# Patient Record
Sex: FEMALE | Race: OTHER | Employment: FULL TIME | URBAN - METROPOLITAN AREA
[De-identification: names, ages, dates, MRNs, and addresses within clinical notes are randomized per-mention and may not be internally consistent; named-entity substitution may affect disease eponyms.]

---

## 2017-04-11 ENCOUNTER — OFFICE VISIT (OUTPATIENT)
Dept: INTERNAL MEDICINE CLINIC | Age: 32
End: 2017-04-11

## 2017-04-11 VITALS
SYSTOLIC BLOOD PRESSURE: 118 MMHG | HEIGHT: 64 IN | DIASTOLIC BLOOD PRESSURE: 72 MMHG | OXYGEN SATURATION: 99 % | BODY MASS INDEX: 22.23 KG/M2 | RESPIRATION RATE: 15 BRPM | WEIGHT: 130.2 LBS | HEART RATE: 88 BPM | TEMPERATURE: 98.5 F

## 2017-04-11 DIAGNOSIS — L63.9 ALOPECIA AREATA: ICD-10-CM

## 2017-04-11 DIAGNOSIS — F41.9 ANXIETY DISORDER, UNSPECIFIED TYPE: Primary | ICD-10-CM

## 2017-04-11 RX ORDER — HYDROCORTISONE 25 MG/G
OINTMENT TOPICAL 2 TIMES DAILY
Qty: 30 G | Refills: 0 | Status: SHIPPED | OUTPATIENT
Start: 2017-04-11

## 2017-04-11 RX ORDER — FLUOXETINE 10 MG/1
10 TABLET ORAL DAILY
Qty: 30 TAB | Refills: 0 | Status: SHIPPED | OUTPATIENT
Start: 2017-04-11 | End: 2017-05-08 | Stop reason: SDUPTHER

## 2017-04-11 RX ORDER — CLOBETASOL PROPIONATE 0.5 MG/G
CREAM TOPICAL
Refills: 99 | COMMUNITY
Start: 2017-01-24 | End: 2017-04-11 | Stop reason: ALTCHOICE

## 2017-04-11 RX ORDER — PAROXETINE 10 MG/1
TABLET, FILM COATED ORAL
Refills: 1 | COMMUNITY
Start: 2017-03-09 | End: 2017-04-11 | Stop reason: ALTCHOICE

## 2017-04-11 NOTE — PROGRESS NOTES
Written by Alexander Davis, as dictated by Dr. Ayesha Oconnor MD.    Kenya Collins is a 32 y.o. female. HPI  The patient comes in today C/O hair loss and she is concerned about post partum depression. Her baby is 14 months now. She is having hair loss in big clumps and she is concerned because she has two balding spots from the hair loss. She denies any FMHx of alopecia. She went back on birth control pills 3 months ago. She has weaned the Paxil down to 5 mg and she feels like she is struggling on this medication. She is thinking about getting pregnant again and she wants to be on a medication that is safe for anxiety during her pregnancy. Patient Active Problem List   Diagnosis Code    IBS (irritable bowel syndrome) K58.9    Other and unspecified hyperlipidemia E78.5        Current Outpatient Prescriptions on File Prior to Visit   Medication Sig Dispense Refill    ethynodiol-ethinyl estradiol (Rere Shannon 1/35, 28,) 1-35 mg-mcg per tablet Take  by mouth.  PNV CMB#21/IRON/FOLIC ACID (PRENATAL COMPLETE PO) Take  by mouth.  ALPRAZolam (XANAX) 0.25 mg tablet Take 1 Tab by mouth nightly as needed for Anxiety for up to 12 doses. Max Daily Amount: 0.25 mg. 12 Tab 0    Cetirizine 10 mg cap Take  by mouth.  fiber Cap Take  by mouth.  ferrous sulfate (IRON) 325 mg (65 mg elemental iron) tablet Take  by mouth Daily (before breakfast). No current facility-administered medications on file prior to visit. Allergies   Allergen Reactions    Benzocaine Swelling     Lips swell       Past Medical History:   Diagnosis Date    Hypercholesterolemia        History reviewed. No pertinent surgical history.     Family History   Problem Relation Age of Onset    Stroke Maternal Grandfather     Dementia Paternal Grandmother      Alzheimer's    Cancer Paternal Grandfather      rare blood cancer    Elevated Lipids Father     Heart Disease Father      CAD stint    Elevated Lipids Brother        Social History     Social History    Marital status:      Spouse name: N/A    Number of children: N/A    Years of education: N/A     Occupational History    Not on file. Social History Main Topics    Smoking status: Never Smoker    Smokeless tobacco: Never Used    Alcohol use No    Drug use: No    Sexual activity: Yes     Partners: Male     Birth control/ protection: Pill, Condom     Other Topics Concern    Not on file     Social History Narrative           Review of Systems   Constitutional: Negative for malaise/fatigue. HENT: Negative for congestion. Eyes: Negative for blurred vision and discharge. Respiratory: Negative for cough and wheezing. Cardiovascular: Negative for chest pain and palpitations. Gastrointestinal: Negative for abdominal pain and heartburn. Genitourinary: Negative for frequency and urgency. Musculoskeletal: Negative for joint pain and myalgias. Neurological: Negative for dizziness, tingling, sensory change, weakness and headaches. Psychiatric/Behavioral: Negative for depression and suicidal ideas. The patient is not nervous/anxious. Visit Vitals    /72 (BP 1 Location: Right arm, BP Patient Position: Sitting)    Pulse 88    Temp 98.5 °F (36.9 °C) (Oral)    Resp 15    Ht 5' 4\" (1.626 m)    Wt 130 lb 3.2 oz (59.1 kg)    LMP 04/08/2017    SpO2 99%    BMI 22.35 kg/m2       Physical Exam   Constitutional: She is oriented to person, place, and time. She appears well-nourished. HENT:   Right Ear: External ear normal.   Left Ear: External ear normal.   Mouth/Throat: Oropharynx is clear and moist.   Eyes: Conjunctivae and EOM are normal.   Neck: Normal range of motion. Neck supple. Cardiovascular: Normal rate and regular rhythm. Pulmonary/Chest: Effort normal and breath sounds normal. She has no wheezes. Abdominal: Soft.  Bowel sounds are normal.   Neurological: She is alert and oriented to person, place, and time. Skin: Skin is intact. Bald circular spot on front scalp area. Psychiatric: She has a normal mood and affect. Nursing note and vitals reviewed. ASSESSMENT and PLAN    ICD-10-CM ICD-9-CM    1. Anxiety disorder, unspecified type F41.9 300.00 FLUoxetine (PROZAC) 10 mg tablet sent to pharmacy    I want her to take 5mg Paxil everyday and start Prozac since she is already weaning off of Paxil. 2. Alopecia areata L63.9 704.01 REFERRAL TO DERMATOLOGY      hydrocortisone (HYTONE) 2.5 % ointment sent to pharmacy. I discussed that her hair loss is due to alopecia. I will start her on steroid cream and send her to dermatologist for possible steroid injection. .       This plan was reviewed with the patient and patient agrees. All questions were answered. This scribe documentation was reviewed by me and accurately reflects the examination and decisions made by me. This note will not be viewable in [address]

## 2017-04-11 NOTE — PROGRESS NOTES
Chief Complaint   Patient presents with   [place] Other     states that she is having some post partum issues. states that her hair is falling out in clumps. follow up question on paxil.

## 2017-04-11 NOTE — MR AVS SNAPSHOT
Visit Information Date & Time Provider Department Dept. Phone Encounter #  
 4/11/2017 12:00 PM Maicol Santiago, [address] [facility],[address] Internal Medicine [phone] [id number] Upcoming Health Maintenance Date Due DTaP/Tdap/Td series (2 - Td) 1/1/2013 PAP AKA CERVICAL CYTOLOGY 12/10/2016 Allergies as of 4/11/2017  Review Complete On: 4/11/2017 By: Dwight Parkinson LPN Severity Noted Reaction Type Reactions Benzocaine  02/23/2012    Swelling Lips swell Current Immunizations  Never Reviewed Name Date Hepatitis B Vaccine 7/1/2011, 2/1/2011, 1/1/2011 Influenza Vaccine Split 11/1/2011 MMR Vaccine 1/1/1990 TDAP Vaccine 1/1/2003 Not reviewed this visit You Were Diagnosed With   
  
 Codes Comments Anxiety disorder, unspecified type    -  Primary ICD-10-CM: F41.9 ICD-9-CM: 300.00 Alopecia areata     ICD-10-CM: L63.9 ICD-9-CM: 704.01 Vitals BP Pulse Temp Resp Height(growth percentile) Weight(growth percentile) 118/72 (BP 1 Location: Right arm, BP Patient Position: Sitting) 88 98.5 °F (36.9 °C) (Oral) 15 5' 4\" (1.626 m) 130 lb 3.2 oz (59.1 kg) LMP SpO2 BMI OB Status Smoking Status 04/08/2017 99% 22.35 kg/m2 Having regular periods Never Smoker BMI and BSA Data Body Mass Index Body Surface Area  
 22.35 kg/m 2 1.63 m 2 Preferred Pharmacy Pharmacy Name Phone [id number] [phone] Your Updated Medication List  
  
   
This list is accurate as of: 4/11/17 12:34 PM.  Always use your most recent med list.  
  
  
  
  
 ALPRAZolam 0.25 mg tablet Commonly known as:  Letitia Cumins Take 1 Tab by mouth nightly as needed for Anxiety for up to 12 doses. Max Daily Amount: 0.25 mg. Cetirizine 10 mg Cap Take  by mouth. fiber Cap Take  by mouth. FLUoxetine 10 mg tablet Commonly known as:  PROzac  
 Take 1 Tab by mouth daily for 30 days. hydrocortisone 2.5 % ointment Commonly known as:  HYTONE Apply  to affected area two (2) times a day. use thin layer Iron 325 mg (65 mg iron) tablet Generic drug:  ferrous sulfate Take  by mouth Daily (before breakfast). Tray Mazariegos 1/35 (28) 1-35 mg-mcg Tab Generic drug:  ethynodiol-ethinyl estradiol Take  by mouth. PRENATAL COMPLETE PO Take  by mouth. Prescriptions Sent to Pharmacy Refills  
 hydrocortisone (HYTONE) 2.5 % ointment 0 Sig: Apply  to affected area two (2) times a day. use thin layer Class: Normal  
 Pharmacy: [facility] #: [phone] Route: Topical  
 FLUoxetine (PROZAC) 10 mg tablet 0 Sig: Take 1 Tab by mouth daily for 30 days. Class: Normal  
 Pharmacy: [facility] #: [phone] Route: Oral  
  
We Performed the Following REFERRAL TO DERMATOLOGY [REF19 Custom] Referral Information Referral ID Referred By Referred To  
  
 [id number] Brennan VALENTINO MD   
   [address] [facility] Phone: [phone] Fax: [phone] Visits Status Start Date End Date 1 New Request 4/11/17 4/11/18 If your referral has a status of pending review or denied, additional information will be sent to support the outcome of this decision. Introducing [facility]! Dear Rebeca Sep: Thank you for requesting a [employer] account. Our records indicate that you already have an active [employer] account. You can access your account anytime at [email or web address] Did you know that you can access your hospital and ER discharge instructions at any time in [employer]SL? You can also review all of your test results from your hospital stay or ER visit. Additional Information If you have questions, please visit the Frequently Asked Questions section of the [employer] website at [email or web address] Remember, [employer] is NOT to be used for urgent needs. For medical emergencies, dial 911. Now available from your iPhone and Android! Please provide this summary of care documentation to your next provider. Your primary care clinician is listed as Taylor Etienne. If you have any questions after today's visit, please call [phone].

## 2017-05-08 DIAGNOSIS — F41.9 ANXIETY DISORDER, UNSPECIFIED TYPE: ICD-10-CM

## 2017-05-08 RX ORDER — FLUOXETINE 10 MG/1
10 TABLET ORAL DAILY
Qty: 30 TAB | Refills: 0 | Status: SHIPPED | OUTPATIENT
Start: 2017-05-08 | End: 2017-05-26 | Stop reason: SDUPTHER

## 2017-05-26 DIAGNOSIS — F41.9 ANXIETY DISORDER, UNSPECIFIED TYPE: ICD-10-CM

## 2017-05-30 DIAGNOSIS — F41.9 ANXIETY DISORDER, UNSPECIFIED TYPE: ICD-10-CM

## 2017-05-30 RX ORDER — FLUOXETINE 10 MG/1
10 TABLET ORAL DAILY
Qty: 30 TAB | Refills: 0 | Status: SHIPPED | OUTPATIENT
Start: 2017-05-30 | End: 2017-05-30 | Stop reason: SDUPTHER

## 2017-05-30 RX ORDER — FLUOXETINE 10 MG/1
10 TABLET ORAL DAILY
Qty: 30 TAB | Refills: 0 | Status: SHIPPED | OUTPATIENT
Start: 2017-05-30 | End: 2017-05-31 | Stop reason: SDUPTHER

## 2017-05-30 NOTE — TELEPHONE ENCOUNTER
From: Shasta January  To: Julian Li NP  Sent: 5/26/2017 8:43 PM EDT  Subject: Medication Renewal Request    Original authorizing provider: Julian Li NP    Kirstie January would like a refill of the following medications:  FLUoxetine (PROZAC) 10 mg tablet Julian Li NP]    Preferred pharmacy: [facility]    Comment:  Hi, I recently had this prescription filled but I spilled the bottle in the sink and the pills dissolved in the water. Can I please get another 30 day refill?  Thanks, Petar Crouch

## 2017-05-30 NOTE — TELEPHONE ENCOUNTER
Last office visit 4/11/2017  Last med refill Amisha Lozano refilled today with confirmed receipt from pharmacy  joe's note is that pharmacy is requesting 90 day

## 2017-05-31 DIAGNOSIS — F41.9 ANXIETY DISORDER, UNSPECIFIED TYPE: ICD-10-CM

## 2017-05-31 RX ORDER — FLUOXETINE 10 MG/1
10 TABLET ORAL DAILY
Qty: 90 TAB | Refills: 1 | Status: SHIPPED | OUTPATIENT
Start: 2017-05-31 | End: 2017-11-14 | Stop reason: SDUPTHER

## 2017-06-04 DIAGNOSIS — F41.9 ANXIETY DISORDER, UNSPECIFIED TYPE: ICD-10-CM

## 2017-06-04 RX ORDER — PAROXETINE 10 MG/1
TABLET, FILM COATED ORAL
Qty: 90 TAB | Refills: 1 | Status: SHIPPED | OUTPATIENT
Start: 2017-06-04

## 2017-11-14 DIAGNOSIS — F41.9 ANXIETY DISORDER, UNSPECIFIED TYPE: ICD-10-CM

## 2017-11-14 RX ORDER — FLUOXETINE 10 MG/1
10 TABLET ORAL DAILY
Qty: 90 TAB | Refills: 0 | Status: SHIPPED | OUTPATIENT
Start: 2017-11-14 | End: 2018-02-14 | Stop reason: SDUPTHER

## 2018-02-14 DIAGNOSIS — F41.9 ANXIETY DISORDER, UNSPECIFIED TYPE: ICD-10-CM

## 2018-02-14 RX ORDER — FLUOXETINE 10 MG/1
TABLET ORAL
Qty: 90 TAB | Refills: 0 | Status: SHIPPED | OUTPATIENT
Start: 2018-02-14

## 2020-02-12 ENCOUNTER — APPOINTMENT (OUTPATIENT)
Dept: CT IMAGING | Age: 35
End: 2020-02-12
Attending: EMERGENCY MEDICINE
Payer: COMMERCIAL

## 2020-02-12 ENCOUNTER — APPOINTMENT (OUTPATIENT)
Dept: ULTRASOUND IMAGING | Age: 35
End: 2020-02-12
Attending: EMERGENCY MEDICINE
Payer: COMMERCIAL

## 2020-02-12 ENCOUNTER — HOSPITAL ENCOUNTER (EMERGENCY)
Age: 35
Discharge: HOME | End: 2020-02-12
Attending: EMERGENCY MEDICINE
Payer: COMMERCIAL

## 2020-02-12 VITALS
HEART RATE: 86 BPM | WEIGHT: 131.61 LBS | SYSTOLIC BLOOD PRESSURE: 104 MMHG | RESPIRATION RATE: 16 BRPM | BODY MASS INDEX: 22.59 KG/M2 | TEMPERATURE: 98.7 F | DIASTOLIC BLOOD PRESSURE: 62 MMHG | OXYGEN SATURATION: 99 %

## 2020-02-12 DIAGNOSIS — B96.89 BV (BACTERIAL VAGINOSIS): ICD-10-CM

## 2020-02-12 DIAGNOSIS — N76.0 BV (BACTERIAL VAGINOSIS): ICD-10-CM

## 2020-02-12 DIAGNOSIS — N83.209 RUPTURED OVARIAN CYST: Primary | ICD-10-CM

## 2020-02-12 LAB
ALBUMIN SERPL-MCNC: 3.8 G/DL (ref 3.5–5)
ALBUMIN/GLOB SERPL: 1.1 {RATIO} (ref 1.1–2.2)
ALP SERPL-CCNC: 53 U/L (ref 45–117)
ALT SERPL-CCNC: 16 U/L (ref 12–78)
ANION GAP SERPL CALC-SCNC: 10 MMOL/L (ref 5–15)
APPEARANCE UR: CLEAR
AST SERPL-CCNC: 16 U/L (ref 15–37)
BASOPHILS # BLD: 0 K/UL (ref 0–0.1)
BASOPHILS NFR BLD: 0 % (ref 0–1)
BILIRUB SERPL-MCNC: 0.9 MG/DL (ref 0.2–1)
BILIRUB UR QL: NEGATIVE
BUN SERPL-MCNC: 9 MG/DL (ref 6–20)
BUN/CREAT SERPL: 11 (ref 12–20)
CALCIUM SERPL-MCNC: 8.4 MG/DL (ref 8.5–10.1)
CHLORIDE SERPL-SCNC: 102 MMOL/L (ref 97–108)
CLUE CELLS VAG QL WET PREP: NORMAL
CO2 SERPL-SCNC: 27 MMOL/L (ref 21–32)
COLOR UR: NORMAL
COMMENT, HOLDF: NORMAL
CREAT SERPL-MCNC: 0.8 MG/DL (ref 0.55–1.02)
DIFFERENTIAL METHOD BLD: ABNORMAL
EOSINOPHIL # BLD: 0 K/UL (ref 0–0.4)
EOSINOPHIL NFR BLD: 1 % (ref 0–7)
ERYTHROCYTE [DISTWIDTH] IN BLOOD BY AUTOMATED COUNT: 12.2 % (ref 11.5–14.5)
GLOBULIN SER CALC-MCNC: 3.5 G/DL (ref 2–4)
GLUCOSE SERPL-MCNC: 89 MG/DL (ref 65–100)
GLUCOSE UR STRIP.AUTO-MCNC: NEGATIVE MG/DL
HCG UR QL: NEGATIVE
HCT VFR BLD AUTO: 36.4 % (ref 35–47)
HGB BLD-MCNC: 11.7 G/DL (ref 11.5–16)
HGB UR QL STRIP: NEGATIVE
IMM GRANULOCYTES # BLD AUTO: 0 K/UL (ref 0–0.04)
IMM GRANULOCYTES NFR BLD AUTO: 0 % (ref 0–0.5)
KETONES UR QL STRIP.AUTO: NEGATIVE MG/DL
KOH PREP SPEC: NORMAL
LACTATE SERPL-SCNC: 0.6 MMOL/L (ref 0.4–2)
LEUKOCYTE ESTERASE UR QL STRIP.AUTO: NEGATIVE
LIPASE SERPL-CCNC: 187 U/L (ref 73–393)
LYMPHOCYTES # BLD: 2 K/UL (ref 0.8–3.5)
LYMPHOCYTES NFR BLD: 34 % (ref 12–49)
MCH RBC QN AUTO: 29.9 PG (ref 26–34)
MCHC RBC AUTO-ENTMCNC: 32.1 G/DL (ref 30–36.5)
MCV RBC AUTO: 93.1 FL (ref 80–99)
MONOCYTES # BLD: 0.4 K/UL (ref 0–1)
MONOCYTES NFR BLD: 7 % (ref 5–13)
NEUTS SEG # BLD: 3.3 K/UL (ref 1.8–8)
NEUTS SEG NFR BLD: 58 % (ref 32–75)
NITRITE UR QL STRIP.AUTO: NEGATIVE
NRBC # BLD: 0 K/UL (ref 0–0.01)
NRBC BLD-RTO: 0 PER 100 WBC
PH UR STRIP: 6 [PH] (ref 5–8)
PLATELET # BLD AUTO: 141 K/UL (ref 150–400)
PMV BLD AUTO: 11.5 FL (ref 8.9–12.9)
POTASSIUM SERPL-SCNC: 3.9 MMOL/L (ref 3.5–5.1)
PROT SERPL-MCNC: 7.3 G/DL (ref 6.4–8.2)
PROT UR STRIP-MCNC: NEGATIVE MG/DL
RBC # BLD AUTO: 3.91 M/UL (ref 3.8–5.2)
SAMPLES BEING HELD,HOLD: NORMAL
SERVICE CMNT-IMP: NORMAL
SODIUM SERPL-SCNC: 139 MMOL/L (ref 136–145)
SP GR UR REFRACTOMETRY: 1.02 (ref 1–1.03)
T VAGINALIS VAG QL WET PREP: NORMAL
UROBILINOGEN UR QL STRIP.AUTO: 0.2 EU/DL (ref 0.2–1)
WBC # BLD AUTO: 5.8 K/UL (ref 3.6–11)

## 2020-02-12 PROCEDURE — 36415 COLL VENOUS BLD VENIPUNCTURE: CPT

## 2020-02-12 PROCEDURE — 74011000258 HC RX REV CODE- 258: Performed by: EMERGENCY MEDICINE

## 2020-02-12 PROCEDURE — 83690 ASSAY OF LIPASE: CPT

## 2020-02-12 PROCEDURE — 87210 SMEAR WET MOUNT SALINE/INK: CPT

## 2020-02-12 PROCEDURE — 81025 URINE PREGNANCY TEST: CPT

## 2020-02-12 PROCEDURE — 81003 URINALYSIS AUTO W/O SCOPE: CPT

## 2020-02-12 PROCEDURE — 76830 TRANSVAGINAL US NON-OB: CPT

## 2020-02-12 PROCEDURE — 80053 COMPREHEN METABOLIC PANEL: CPT

## 2020-02-12 PROCEDURE — 76856 US EXAM PELVIC COMPLETE: CPT

## 2020-02-12 PROCEDURE — 99284 EMERGENCY DEPT VISIT MOD MDM: CPT

## 2020-02-12 PROCEDURE — 74177 CT ABD & PELVIS W/CONTRAST: CPT

## 2020-02-12 PROCEDURE — 85025 COMPLETE CBC W/AUTO DIFF WBC: CPT

## 2020-02-12 PROCEDURE — 87491 CHLMYD TRACH DNA AMP PROBE: CPT

## 2020-02-12 PROCEDURE — 83605 ASSAY OF LACTIC ACID: CPT

## 2020-02-12 PROCEDURE — 74011250636 HC RX REV CODE- 250/636: Performed by: EMERGENCY MEDICINE

## 2020-02-12 PROCEDURE — 74011636320 HC RX REV CODE- 636/320

## 2020-02-12 RX ORDER — NAPROXEN 500 MG/1
500 TABLET ORAL 2 TIMES DAILY WITH MEALS
Qty: 20 TAB | Refills: 0 | Status: SHIPPED | OUTPATIENT
Start: 2020-02-12 | End: 2020-02-22

## 2020-02-12 RX ORDER — SODIUM CHLORIDE 0.9 % (FLUSH) 0.9 %
10 SYRINGE (ML) INJECTION ONCE
Status: COMPLETED | OUTPATIENT
Start: 2020-02-12 | End: 2020-02-12

## 2020-02-12 RX ORDER — METRONIDAZOLE 500 MG/1
500 TABLET ORAL 2 TIMES DAILY
Qty: 14 TAB | Refills: 0 | Status: SHIPPED | OUTPATIENT
Start: 2020-02-12 | End: 2020-02-19

## 2020-02-12 RX ADMIN — SODIUM CHLORIDE 50 ML: 900 INJECTION, SOLUTION INTRAVENOUS at 08:55

## 2020-02-12 RX ADMIN — IOPAMIDOL 100 ML: 755 INJECTION, SOLUTION INTRAVENOUS at 08:55

## 2020-02-12 RX ADMIN — SODIUM CHLORIDE 1000 ML: 900 INJECTION, SOLUTION INTRAVENOUS at 08:36

## 2020-02-12 RX ADMIN — Medication 10 ML: at 08:55

## 2020-02-12 NOTE — ED PROVIDER NOTES
77-year-old G2, P2 with IUD in place presents to the emergency department noting acute onset of lower abdominal pain this morning around 1 hour prior to arrival which awoke the patient from sleep. She described as a sharp achy pain in her far lower right and left lower quadrants. That initially felt like she needed to urinate very badly but did not improve with urination. She denies any hematuria, urinary urgency, urinary frequency, dysuria, fever, chills, nausea, vomiting, diarrhea, vaginal bleeding. She does note some very mild vaginal discharge but states that she is sexually active with her  and has low suspicion for sexual transmitted infection. She rates her pain as 6/10 and states that it seems to have improved slightly from her initial sx. She has not taken anything for her symptoms. She declines any pain medication at this time stating that Deveron Reach is all right right now. \" No other medical concerns           Past Medical History:   Diagnosis Date    Hypercholesterolemia        No past surgical history on file.       Family History:   Problem Relation Age of Onset    Stroke Maternal Grandfather     Dementia Paternal Grandmother         Alzheimer's    Cancer Paternal Grandfather         rare blood cancer    Elevated Lipids Father     Heart Disease Father         CAD stint    Elevated Lipids Brother        Social History     Socioeconomic History    Marital status:      Spouse name: Not on file    Number of children: Not on file    Years of education: Not on file    Highest education level: Not on file   Occupational History    Not on file   Social Needs    Financial resource strain: Not on file    Food insecurity:     Worry: Not on file     Inability: Not on file    Transportation needs:     Medical: Not on file     Non-medical: Not on file   Tobacco Use    Smoking status: Never Smoker    Smokeless tobacco: Never Used   Substance and Sexual Activity    Alcohol use: No    Drug use: No    Sexual activity: Yes     Partners: Male     Birth control/protection: Pill, Condom   Lifestyle    Physical activity:     Days per week: Not on file     Minutes per session: Not on file    Stress: Not on file   Relationships    Social connections:     Talks on phone: Not on file     Gets together: Not on file     Attends Mu-ism service: Not on file     Active member of club or organization: Not on file     Attends meetings of clubs or organizations: Not on file     Relationship status: Not on file    Intimate partner violence:     Fear of current or ex partner: Not on file     Emotionally abused: Not on file     Physically abused: Not on file     Forced sexual activity: Not on file   Other Topics Concern    Not on file   Social History Narrative    Not on file         ALLERGIES: Benzocaine    Review of Systems   Constitutional: Negative for activity change, appetite change, chills and fever. HENT: Negative for congestion, rhinorrhea, sinus pressure, sneezing and sore throat. Eyes: Negative for photophobia and visual disturbance. Respiratory: Negative for cough and shortness of breath. Cardiovascular: Negative for chest pain. Gastrointestinal: Positive for abdominal pain (lower). Negative for blood in stool, constipation, diarrhea, nausea and vomiting. Genitourinary: Positive for pelvic pain and vaginal discharge (very mild). Negative for difficulty urinating, dysuria, flank pain, frequency, hematuria, menstrual problem, urgency and vaginal bleeding. Musculoskeletal: Negative for arthralgias, back pain, myalgias and neck pain. Skin: Negative for rash and wound. Neurological: Negative for syncope, weakness, numbness and headaches. Psychiatric/Behavioral: Negative for self-injury and suicidal ideas. All other systems reviewed and are negative.       Vitals:    02/12/20 0746   BP: 117/80   Pulse: 92   Resp: 18   Temp: 98.4 °F (36.9 °C)   SpO2: 100%   Weight: 59.7 kg (131 lb 9.8 oz)            Physical Exam  Vitals signs and nursing note reviewed. Constitutional:       General: She is not in acute distress. Appearance: Normal appearance. She is well-developed. She is not diaphoretic. Comments: Pleasant in NAD   HENT:      Head: Normocephalic and atraumatic. Nose: Nose normal.   Eyes:      Extraocular Movements: Extraocular movements intact. Conjunctiva/sclera: Conjunctivae normal.      Pupils: Pupils are equal, round, and reactive to light. Neck:      Musculoskeletal: Neck supple. Cardiovascular:      Rate and Rhythm: Normal rate and regular rhythm. Heart sounds: Normal heart sounds. Pulmonary:      Effort: Pulmonary effort is normal.      Breath sounds: Normal breath sounds. Abdominal:      General: There is no distension. Palpations: Abdomen is soft. Tenderness: There is abdominal tenderness in the right lower quadrant, suprapubic area and left lower quadrant. There is no guarding or rebound. Negative signs include Andrews's sign, Rovsing's sign and McBurney's sign. Hernia: There is no hernia in the right inguinal area or left inguinal area. Genitourinary:     Labia:         Right: No rash, tenderness or lesion. Left: No rash, tenderness, lesion or injury. Vagina: Vaginal discharge (Trace) present. No tenderness or bleeding. Cervix: No cervical motion tenderness, discharge, friability or erythema. Uterus: Normal.       Adnexa: Right adnexa normal and left adnexa normal.        Right: No tenderness. Left: No tenderness. Musculoskeletal:         General: No tenderness. Skin:     General: Skin is warm and dry. Neurological:      General: No focal deficit present. Mental Status: She is alert and oriented to person, place, and time. Cranial Nerves: No cranial nerve deficit. Sensory: No sensory deficit. Motor: No weakness.       Coordination: Coordination normal.          MDM 35-year-old female presents with acute onset of lower abdominal/pelvic pain this morning. Concern for appendicitis, nephrolithiasis, ovarian torsion, ectopic pregnancy, PID, UTI, colitis or diverticulitis. .  Patient is afebrile with vital signs stable in no acute distress. She was given IV fluid bolus, declined pain medication. Pelvic exam showing no significant discharge or tenderness. Low suspicion for STI.    hCG negative  UA negative for infection or hematuria. Wet prep shows clue cells but otherwise negative. Labs returned showing no significant abnormalities, normal lactic acid, no leukocytosis. CT abdomen pelvis was ordered to further evaluate and returned showing fluid within the pelvis which appears significantly more than expected for physiologic fluid. Pelvic ultrasound was then ordered to further evaluate and returned showing evidence of a ruptured ovarian cyst but no other significant abnormalities. Normal blood flow to both ovaries with no evidence of torsion. Rx'd Naprosyn and Flagyl and recommended OB/GYN follow-up. Return precautions were given for worsening or concerns.       Procedures

## 2020-02-12 NOTE — ED NOTES
The patient was discharged home in stable condition. The patient is alert and oriented, in no respiratory distress. The patient's diagnosis, condition and treatment were explained by ER Physician. The patient expressed understanding. A discharge plan has been developed. Discharge instructions were given.   Pt ambulatory out of the ED.  ]

## 2020-02-12 NOTE — ED TRIAGE NOTES
TRIAGE NOTE: Arrives for pelvic pain since this morning. States she \"almost passed out\" due to pain. No other s/s.

## 2020-02-14 LAB
C TRACH DNA SPEC QL NAA+PROBE: NEGATIVE
N GONORRHOEA DNA SPEC QL NAA+PROBE: NEGATIVE
SAMPLE TYPE: NORMAL
SERVICE CMNT-IMP: NORMAL
SPECIMEN SOURCE: NORMAL